# Patient Record
Sex: FEMALE | Race: WHITE | NOT HISPANIC OR LATINO | Employment: FULL TIME | URBAN - NONMETROPOLITAN AREA
[De-identification: names, ages, dates, MRNs, and addresses within clinical notes are randomized per-mention and may not be internally consistent; named-entity substitution may affect disease eponyms.]

---

## 2017-09-13 ENCOUNTER — OFFICE VISIT (OUTPATIENT)
Dept: FAMILY MEDICINE CLINIC | Facility: CLINIC | Age: 32
End: 2017-09-13

## 2017-09-13 VITALS
HEIGHT: 63 IN | HEART RATE: 90 BPM | OXYGEN SATURATION: 95 % | SYSTOLIC BLOOD PRESSURE: 138 MMHG | DIASTOLIC BLOOD PRESSURE: 84 MMHG | BODY MASS INDEX: 30.02 KG/M2 | TEMPERATURE: 97.6 F | WEIGHT: 169.4 LBS

## 2017-09-13 DIAGNOSIS — J06.9 ACUTE URI: ICD-10-CM

## 2017-09-13 DIAGNOSIS — H65.191 OTHER ACUTE NONSUPPURATIVE OTITIS MEDIA OF RIGHT EAR, RECURRENCE NOT SPECIFIED: Primary | ICD-10-CM

## 2017-09-13 DIAGNOSIS — J40 BRONCHITIS: ICD-10-CM

## 2017-09-13 PROCEDURE — 99203 OFFICE O/P NEW LOW 30 MIN: CPT | Performed by: PHYSICIAN ASSISTANT

## 2017-09-13 RX ORDER — IBUPROFEN 200 MG
200 TABLET ORAL AS NEEDED
COMMUNITY

## 2017-09-13 RX ORDER — CEFDINIR 300 MG/1
300 CAPSULE ORAL 2 TIMES DAILY
Qty: 20 CAPSULE | Refills: 0 | Status: SHIPPED | OUTPATIENT
Start: 2017-09-13 | End: 2017-10-13

## 2017-09-13 RX ORDER — ACETAMINOPHEN, ASPIRIN AND CAFFEINE 250; 250; 65 MG/1; MG/1; MG/1
1 TABLET, FILM COATED ORAL AS NEEDED
COMMUNITY

## 2017-09-13 RX ORDER — ALBUTEROL SULFATE 90 UG/1
2 AEROSOL, METERED RESPIRATORY (INHALATION) EVERY 4 HOURS PRN
Qty: 1 INHALER | Refills: 0 | Status: SHIPPED | OUTPATIENT
Start: 2017-09-13

## 2017-09-13 NOTE — PROGRESS NOTES
Subjective   Shasta Godinez is a 32 y.o. female. PATIENT IS BEING SEEN TODAY TO ESTABLISH CARE. SHE IS NOT FASTING TODAY. SHE DOES WANT TO DISCUSS A COUGH AND NASAL CONGESTION. THIS HAS BEEN GOING FOR 4 DAYS. SHE STATES SHE HAS BEEN TAKING DELSYM, MUCINEX, AND CLARITIN FOR SYMPTOMS. IT SEEMS TO BE HELPING BUT NOT ALL THE WAY. SHE WOULD LIKE TO BE PRESCRIBED CLARITIN D OR SOMETHING SIMILAR.     History of Present Illness     PREVIOUS PCP- DR JACKSON- STARTED BETWEEN AGE 4-6 YEARS OF AGE. LAST SEEN THERE 2-3 YEARS AGO.     STARTED WITH DRY COUGH 16. EARS FEEL STOPPED UP BUT NOT PAINFUL. NO V/D. NO FEVER. THROAT TENDER LAST NIGHT AND THIS AM FROM COUGH. COUGH WORSE IN MORNING AND NIGHT. NO SICK CONTACTS. DELSYM, MUCINEX, AND CLARITIN WITHOUT HELP.     HX ANEMIA WITH PREGNANCY. FATHER  WHEN WAS 13 - TOOK PAXIL. STARTED THEN AND STOPPED YEARS AGO.     DX MIGRAINES AS A CHILD- PREVIOUSLY WATCHED WITH NUMBNESS IN HANDS, FACE, MOUTH, THROAT ON 1 SIDE OF THE BODY, COULDN'T SEE, WALK. NOW HAS EXCEDRIN MIGRAINE THAT SHE TAKES BEFORE GETS BAD. ALSO GOT PIERCING. GETS MIGRAINES WITHOUT HEADACHE- SPOTS IN VISION, NUMBNESS IN FINGERS AND UP ARM, FACE, MOUTH AND THROAT GET NUMB- HAS TO TAKE EXCEDRIN AND LAY DOWN. HAD MRI WHEN WAS YOUNGER. WAS SEEN BY SPECIALIST. AFTER HAD SON, WOULD HAVE EPISODES WITHOUT HEADACHES- HAD EEG NORMAL. STATES WAS TOLD WAS HAVING MIGRAINE WITHOUT THE HEADACHE.     The following portions of the patient's history were reviewed and updated as appropriate: allergies, current medications, past family history, past medical history, past social history, past surgical history and problem list.    Review of Systems   HENT: Positive for congestion (NASAL CONGESTION.).    Respiratory: Positive for cough.    Neurological: Positive for headaches.   All other systems reviewed and are negative.      Objective   Physical Exam   Constitutional: She is oriented to person, place, and time. Vital signs are normal. She  appears well-developed and well-nourished.   HENT:   Head: Normocephalic and atraumatic.   Right Ear: External ear and ear canal normal. Tympanic membrane is injected and erythematous. A middle ear effusion is present.   Left Ear: Tympanic membrane, external ear and ear canal normal.   Nose: Nose normal.   Mouth/Throat: Uvula is midline, oropharynx is clear and moist and mucous membranes are normal.   Eyes: Conjunctivae are normal.   Neck: Neck supple.   Cardiovascular: Normal rate and regular rhythm.  Exam reveals no gallop and no friction rub.    Murmur (RUSB) heard.   Systolic murmur is present with a grade of 2/6   Pulmonary/Chest: Effort normal and breath sounds normal. She has no wheezes. She has no rhonchi. She has no rales.   Neurological: She is alert and oriented to person, place, and time.   Skin: Skin is warm and dry.   Psychiatric: She has a normal mood and affect. Her speech is normal and behavior is normal. Judgment and thought content normal. Cognition and memory are normal.   Nursing note and vitals reviewed.      Assessment/Plan   Shasta was seen today for establish care, cough and nasal congestion.    Diagnoses and all orders for this visit:    Other acute nonsuppurative otitis media of right ear, recurrence not specified  -     albuterol (PROVENTIL HFA;VENTOLIN HFA) 108 (90 Base) MCG/ACT inhaler; Inhale 2 puffs Every 4 (Four) Hours As Needed for Wheezing.  -     cefdinir (OMNICEF) 300 MG capsule; Take 1 capsule by mouth 2 (Two) Times a Day.    Acute URI  -     albuterol (PROVENTIL HFA;VENTOLIN HFA) 108 (90 Base) MCG/ACT inhaler; Inhale 2 puffs Every 4 (Four) Hours As Needed for Wheezing.  -     cefdinir (OMNICEF) 300 MG capsule; Take 1 capsule by mouth 2 (Two) Times a Day.    Bronchitis  -     albuterol (PROVENTIL HFA;VENTOLIN HFA) 108 (90 Base) MCG/ACT inhaler; Inhale 2 puffs Every 4 (Four) Hours As Needed for Wheezing.  -     cefdinir (OMNICEF) 300 MG capsule; Take 1 capsule by mouth 2 (Two)  Times a Day.        Patient Instructions   32 YEAR OLD FEMALE WHO PRESENTS TODAY AS A NEW PATIENT. SHE HAS PMH SIGNIFICANT FOR ANEMIA WITH PREGNANCY AND MIGRAINE HEADACHES. SHE HAS BEEN STABLE. PATIENT WITHOUT ROUTINE PRIMARY CARE FOR A COUPLE YEARS. I HAVE ASKED THAT SHE SIGN A MEDICAL RELEASE TODAY FOR PREVIOUS PCP AND FOLLOW UP IN 1 MONTH FOR CPE WITH FASTING LABS TO INCLUDE LABS FOR ANEMIA. PATIENT TO CALL 1 WEEK PRIOR TO ENSURE WE HAVE RECEIVED RECORDS. SHE HAD HEART MURMUR ON EXAM TODAY- I WILL RE-EVALUATE AT CPE. IF CONTINUES WITH HEART MURMUR, SHOULD CONSIDER ECHO.     SHE STARTED 9/9/17 WITH COUGHING AND HAS HAD EARS FEELING FULL. RIGHT AOM ON EXAM- OTHERWISE, BENIGN. I WOULD LIKE HER TO CONTINUE CLARITIN 10 MG ONCE DAILY, MUCINEX AND DELSYM TWICE DAILY, AND START FLONASE 2 SPRAYS IN EACH NOSTRIL ONCE DAILY. I WILL ALSO HAVE HER USE ALBUTEROL INHALER AS NEEDED AND OMNICEF TWICE DAILY FOR 10 DAYS. TO CALL OR RETURN IF WORSENING OR NEW SYMPTOMS.

## 2017-09-13 NOTE — PATIENT INSTRUCTIONS
32 YEAR OLD FEMALE WHO PRESENTS TODAY AS A NEW PATIENT. SHE HAS PMH SIGNIFICANT FOR ANEMIA WITH PREGNANCY AND MIGRAINE HEADACHES. SHE HAS BEEN STABLE. PATIENT WITHOUT ROUTINE PRIMARY CARE FOR A COUPLE YEARS. I HAVE ASKED THAT SHE SIGN A MEDICAL RELEASE TODAY FOR PREVIOUS PCP AND FOLLOW UP IN 1 MONTH FOR CPE WITH FASTING LABS TO INCLUDE LABS FOR ANEMIA. PATIENT TO CALL 1 WEEK PRIOR TO ENSURE WE HAVE RECEIVED RECORDS. SHE HAD HEART MURMUR ON EXAM TODAY- I WILL RE-EVALUATE AT CPE. IF CONTINUES WITH HEART MURMUR, SHOULD CONSIDER ECHO.     SHE STARTED 9/9/17 WITH COUGHING AND HAS HAD EARS FEELING FULL. RIGHT AOM ON EXAM- OTHERWISE, BENIGN. I WOULD LIKE HER TO CONTINUE CLARITIN 10 MG ONCE DAILY, MUCINEX AND DELSYM TWICE DAILY, AND START FLONASE 2 SPRAYS IN EACH NOSTRIL ONCE DAILY. I WILL ALSO HAVE HER USE ALBUTEROL INHALER AS NEEDED AND OMNICEF TWICE DAILY FOR 10 DAYS. TO CALL OR RETURN IF WORSENING OR NEW SYMPTOMS.   
Universal Safety Interventions

## 2017-09-15 ENCOUNTER — TELEPHONE (OUTPATIENT)
Dept: FAMILY MEDICINE CLINIC | Facility: CLINIC | Age: 32
End: 2017-09-15

## 2017-09-15 DIAGNOSIS — R94.39 ABNORMAL CAROTID DUPLEX SCAN: Primary | ICD-10-CM

## 2017-09-15 NOTE — TELEPHONE ENCOUNTER
I REVIEWED DR JACKSON'S RECORDS- PATIENT HAD POSSIBLE CAROTID STENOSIS ON CAROTID DUPLEX IN THE PAST. I WILL REPEAT TESTING.

## 2017-09-18 NOTE — TELEPHONE ENCOUNTER
PLEASE GIVE HER THE INFORMATION FOR THE CAROTID SCREENING- I THINK IT IS $30. IT IS TO REPEAT FROM AN ABNORMAL SCAN WITH DR JACKSON

## 2017-09-18 NOTE — TELEPHONE ENCOUNTER
Patient wants to know why she has to have the Carotid doppler that it is going to cost her $600 her portion and she can't afford it wants to know if there is anything else cheaper

## 2017-09-22 ENCOUNTER — TELEPHONE (OUTPATIENT)
Dept: FAMILY MEDICINE CLINIC | Facility: CLINIC | Age: 32
End: 2017-09-22

## 2017-09-22 NOTE — TELEPHONE ENCOUNTER
I WILL NOT REFILL ANTIBIOTIC. I CAN GIVE DAILY INHALER IF SHE CONTINUES WITH COUGH OR SHE WILL NEED RE-EVALUATION WITH CONTINUED COUGH.

## 2017-09-22 NOTE — TELEPHONE ENCOUNTER
Pt called requesting another round of antibiotics. She is almost done with her first round but still has a cough. She wants to know if you will send in another round or something stronger for her. She would like this sent to [facility].

## 2017-10-13 ENCOUNTER — OFFICE VISIT (OUTPATIENT)
Dept: FAMILY MEDICINE CLINIC | Facility: CLINIC | Age: 32
End: 2017-10-13

## 2017-10-13 VITALS
DIASTOLIC BLOOD PRESSURE: 78 MMHG | SYSTOLIC BLOOD PRESSURE: 128 MMHG | OXYGEN SATURATION: 98 % | WEIGHT: 172.6 LBS | TEMPERATURE: 98 F | BODY MASS INDEX: 30.58 KG/M2 | HEART RATE: 98 BPM | HEIGHT: 63 IN

## 2017-10-13 DIAGNOSIS — Z00.00 ROUTINE ADULT HEALTH MAINTENANCE: Primary | ICD-10-CM

## 2017-10-13 DIAGNOSIS — R31.9 HEMATURIA, UNSPECIFIED TYPE: ICD-10-CM

## 2017-10-13 DIAGNOSIS — D64.9 ANEMIA, UNSPECIFIED TYPE: ICD-10-CM

## 2017-10-13 LAB
BILIRUB BLD-MCNC: NEGATIVE MG/DL
CLARITY, POC: ABNORMAL
COLOR UR: YELLOW
GLUCOSE UR STRIP-MCNC: NEGATIVE MG/DL
KETONES UR QL: NEGATIVE
LEUKOCYTE EST, POC: ABNORMAL
NITRITE UR-MCNC: NEGATIVE MG/ML
PH UR: 7.5 [PH] (ref 5–8)
PROT UR STRIP-MCNC: ABNORMAL MG/DL
RBC # UR STRIP: ABNORMAL /UL
SP GR UR: 1.01 (ref 1–1.03)
UROBILINOGEN UR QL: NORMAL

## 2017-10-13 PROCEDURE — 81003 URINALYSIS AUTO W/O SCOPE: CPT | Performed by: PHYSICIAN ASSISTANT

## 2017-10-13 PROCEDURE — 99395 PREV VISIT EST AGE 18-39: CPT | Performed by: PHYSICIAN ASSISTANT

## 2017-10-13 RX ORDER — LORATADINE 10 MG/1
10 CAPSULE, LIQUID FILLED ORAL
COMMUNITY

## 2017-10-13 NOTE — PATIENT INSTRUCTIONS
32 YEAR OLD FEMALE WHO PRESENTS TODAY FOR CPE. SHE IS DOING WELL WITHOUT COMPLAINTS. CPE COMPLETED. PAP COMPLETED. FASTING LABS TODAY- CALL IF NO RESULTS IN 1 WEEK. FOLLOW UP PENDING RESULTS. PATIENT IS TO SCHEDULE TRIPLE ARTERIAL SCREEN ASAP. SHE HAD POSSIBLE ABNORMAL CAROTID DUPLEX IN THE PAST WITH PREVIOUS PCP.     PATIENT HAS PALPABLE ABNORMALITY OVER MIDDLE OF LEFT TIBIA ANTERIORLY. TIBIA IS NOT SMOOTH WITH SLIGHT DEPRESSION THEN SOFT TISSUE DENSITY VS VARICOSE VEIN OVERLYING. CONSIDERATION OF TIB/FIB XRAY AND IF NORMAL, US TO BETTER CHARACTERIZE. PATIENT WILL LET ME KNOW WHEN IS FINANCIALLY FEASIBLE.

## 2017-10-13 NOTE — PROGRESS NOTES
Subjective   Shasta Godinez is a 32 y.o. female.  Here for physical and pap today.  Declines flu shot.    History of Present Illness     PATIENT STATES SHE WAS UNABLE TO DO CAROTID ARTERY DUPLEX DUE TO COST. JUST RECEIVED SCREENING INFORMATION IN THE MAIL AND WILL SCHEDULE.     LAST PAP- 3-4 YEARS. NO HX ABNORMAL PAP. SA 1 PARTNER X 18 YEARS. SOME PC BLEEDING. NO DYSPAREUNIA. SOMETIME D/C AND ODOR. NO ITCHING.   NO MAMMOGRAM  NO COLONOSCOPY  COUSIN WITH BREAST CA- NO OTHER FHX BREAST, COLON, OVARIAN OR UTERINE CA.   LAST TD 2 YEARS AGO- DR RAZA OFFICE FOR PATIENT SCRATCH.   NO FLU SHOT.     NO NEW COMPLAINTS OR PROBLEMS.     The following portions of the patient's history were reviewed and updated as appropriate: allergies, current medications, past family history, past medical history, past social history, past surgical history and problem list.    Review of Systems   Respiratory: Positive for cough.    All other systems reviewed and are negative.      Objective   Physical Exam   Constitutional: She is oriented to person, place, and time. She appears well-developed and well-nourished. No distress.   HENT:   Head: Normocephalic and atraumatic.   Right Ear: Hearing, tympanic membrane, external ear and ear canal normal.   Left Ear: Hearing, tympanic membrane, external ear and ear canal normal.   Nose: Nose normal.   Mouth/Throat: Oropharynx is clear and moist.   Eyes: Conjunctivae, EOM and lids are normal. Pupils are equal, round, and reactive to light.   Neck: Neck supple. No JVD present. Carotid bruit is not present. No tracheal deviation present. No thyroid mass and no thyromegaly present.   Cardiovascular: Normal rate, regular rhythm, normal heart sounds and intact distal pulses.  Exam reveals no gallop and no friction rub.    No murmur heard.  Pulses:       Radial pulses are 2+ on the right side, and 2+ on the left side.        Posterior tibial pulses are 2+ on the right side, and 2+ on the left side.    Pulmonary/Chest: Effort normal and breath sounds normal. No respiratory distress. She has no wheezes. She has no rhonchi. She has no rales. Right breast exhibits no inverted nipple, no mass, no nipple discharge, no skin change and no tenderness. Left breast exhibits no inverted nipple, no mass, no nipple discharge, no skin change and no tenderness.   Abdominal: Soft. Normal aorta and bowel sounds are normal. She exhibits no distension and no abdominal bruit. There is no hepatosplenomegaly. There is no tenderness. There is no rigidity, no rebound and no guarding. No hernia.   Genitourinary: Uterus normal. There is no rash, tenderness, lesion or injury on the right labia. There is no rash, tenderness, lesion or injury on the left labia. Cervix exhibits no motion tenderness, no discharge and no friability. Right adnexum displays tenderness. Right adnexum displays no mass and no fullness. Left adnexum displays no mass, no tenderness and no fullness. Vaginal discharge (MILD WHITE, THIN D/C., ) found.   Musculoskeletal: Normal range of motion. She exhibits no edema, tenderness or deformity.        Legs:  SLIGHT DEPRESSION OF TIBIA AT AREA INDICATED IN YELLOW WITH SOFT TISSUE DENSITY OVERLYING VS VARICOSE VEIN- VARICOSE VEIN NOTED LATERAL TO AREA.    Lymphadenopathy:     She has no cervical adenopathy.     She has no axillary adenopathy.   Neurological: She is alert and oriented to person, place, and time. She has normal strength and normal reflexes. She displays normal reflexes. No cranial nerve deficit or sensory deficit. She exhibits normal muscle tone. Coordination and gait normal.   Skin: Skin is warm and dry. No rash noted. She is not diaphoretic. No erythema.   Psychiatric: She has a normal mood and affect. Her speech is normal and behavior is normal. Judgment and thought content normal. Cognition and memory are normal.       Assessment/Plan   Shasta was seen today for annual exam.    Diagnoses and all orders for  this visit:    Routine adult health maintenance  -     CBC & Differential  -     Comprehensive Metabolic Panel  -     Lipid Panel With LDL / HDL Ratio  -     Thyroid Panel With TSH  -     Iron and TIBC  -     Ferritin  -     Vitamin B12 & Folate  -     Vitamin D 25 Hydroxy  -     POC Urinalysis Dipstick, Automated  -     Pap IG, Ct-Ng, Rfx HPV ASCU - ThinPrep Vial, Cervix    Anemia, unspecified type  -     CBC & Differential  -     Comprehensive Metabolic Panel  -     Lipid Panel With LDL / HDL Ratio  -     Thyroid Panel With TSH  -     Iron and TIBC  -     Ferritin  -     Vitamin B12 & Folate  -     Vitamin D 25 Hydroxy  -     POC Urinalysis Dipstick, Automated  -     Pap IG, Ct-Ng, Rfx HPV ASCU - ThinPrep Vial, Cervix    Hematuria, unspecified type  -     Urinalysis With Microscopic - Urine, Clean Catch  -     Urine Culture - Urine, Urine, Clean Catch      Patient Instructions   32 YEAR OLD FEMALE WHO PRESENTS TODAY FOR CPE. SHE IS DOING WELL WITHOUT COMPLAINTS. CPE COMPLETED. PAP COMPLETED. FASTING LABS TODAY- CALL IF NO RESULTS IN 1 WEEK. FOLLOW UP PENDING RESULTS. PATIENT IS TO SCHEDULE TRIPLE ARTERIAL SCREEN ASAP. SHE HAD POSSIBLE ABNORMAL CAROTID DUPLEX IN THE PAST WITH PREVIOUS PCP.     PATIENT HAS PALPABLE ABNORMALITY OVER MIDDLE OF LEFT TIBIA ANTERIORLY. TIBIA IS NOT SMOOTH WITH SLIGHT DEPRESSION THEN SOFT TISSUE DENSITY VS VARICOSE VEIN OVERLYING. CONSIDERATION OF TIB/FIB XRAY AND IF NORMAL, US TO BETTER CHARACTERIZE. PATIENT WILL LET ME KNOW WHEN IS FINANCIALLY FEASIBLE.

## 2017-10-14 LAB
25(OH)D3+25(OH)D2 SERPL-MCNC: 34.2 NG/ML (ref 30–100)
ALBUMIN SERPL-MCNC: 4.3 G/DL (ref 3.5–5.2)
ALBUMIN/GLOB SERPL: 1.8 G/DL
ALP SERPL-CCNC: 56 U/L (ref 39–117)
ALT SERPL-CCNC: 16 U/L (ref 1–33)
AST SERPL-CCNC: 12 U/L (ref 1–32)
BASOPHILS # BLD AUTO: 0.02 10*3/MM3 (ref 0–0.2)
BASOPHILS NFR BLD AUTO: 0.2 % (ref 0–1.5)
BILIRUB SERPL-MCNC: 0.3 MG/DL (ref 0.1–1.2)
BUN SERPL-MCNC: 16 MG/DL (ref 6–20)
BUN/CREAT SERPL: 17 (ref 7–25)
CALCIUM SERPL-MCNC: 9.4 MG/DL (ref 8.6–10.5)
CHLORIDE SERPL-SCNC: 102 MMOL/L (ref 98–107)
CHOLEST SERPL-MCNC: 210 MG/DL (ref 0–200)
CO2 SERPL-SCNC: 27.7 MMOL/L (ref 22–29)
CREAT SERPL-MCNC: 0.94 MG/DL (ref 0.57–1)
EOSINOPHIL # BLD AUTO: 0.59 10*3/MM3 (ref 0–0.7)
EOSINOPHIL NFR BLD AUTO: 6.2 % (ref 0.3–6.2)
ERYTHROCYTE [DISTWIDTH] IN BLOOD BY AUTOMATED COUNT: 14.1 % (ref 11.7–13)
FERRITIN SERPL-MCNC: 99.09 NG/ML (ref 13–150)
FOLATE SERPL-MCNC: 15.25 NG/ML (ref 4.78–24.2)
FT4I SERPL CALC-MCNC: 2.1 (ref 1.2–4.9)
GLOBULIN SER CALC-MCNC: 2.4 GM/DL
GLUCOSE SERPL-MCNC: 88 MG/DL (ref 65–99)
HCT VFR BLD AUTO: 42.1 % (ref 35.6–45.5)
HDLC SERPL-MCNC: 34 MG/DL (ref 40–60)
HGB BLD-MCNC: 13.4 G/DL (ref 11.9–15.5)
IMM GRANULOCYTES # BLD: 0.02 10*3/MM3 (ref 0–0.03)
IMM GRANULOCYTES NFR BLD: 0.2 % (ref 0–0.5)
IRON SATN MFR SERPL: 23 % (ref 20–50)
IRON SERPL-MCNC: 77 MCG/DL (ref 37–145)
LDLC SERPL CALC-MCNC: 146 MG/DL (ref 0–100)
LDLC/HDLC SERPL: 4.31 {RATIO}
LYMPHOCYTES # BLD AUTO: 2.15 10*3/MM3 (ref 0.9–4.8)
LYMPHOCYTES NFR BLD AUTO: 22.5 % (ref 19.6–45.3)
MCH RBC QN AUTO: 29.6 PG (ref 26.9–32)
MCHC RBC AUTO-ENTMCNC: 31.8 G/DL (ref 32.4–36.3)
MCV RBC AUTO: 93.1 FL (ref 80.5–98.2)
MONOCYTES # BLD AUTO: 0.61 10*3/MM3 (ref 0.2–1.2)
MONOCYTES NFR BLD AUTO: 6.4 % (ref 5–12)
NEUTROPHILS # BLD AUTO: 6.17 10*3/MM3 (ref 1.9–8.1)
NEUTROPHILS NFR BLD AUTO: 64.5 % (ref 42.7–76)
PLATELET # BLD AUTO: 331 10*3/MM3 (ref 140–500)
POTASSIUM SERPL-SCNC: 4.7 MMOL/L (ref 3.5–5.2)
PROT SERPL-MCNC: 6.7 G/DL (ref 6–8.5)
RBC # BLD AUTO: 4.52 10*6/MM3 (ref 3.9–5.2)
SODIUM SERPL-SCNC: 141 MMOL/L (ref 136–145)
T3RU NFR SERPL: 28 % (ref 24–39)
T4 SERPL-MCNC: 7.4 UG/DL (ref 4.5–12)
TIBC SERPL-MCNC: 340 MCG/DL
TRIGL SERPL-MCNC: 148 MG/DL (ref 0–150)
TSH SERPL DL<=0.005 MIU/L-ACNC: 1.64 UIU/ML (ref 0.45–4.5)
UIBC SERPL-MCNC: 263 MCG/DL
VIT B12 SERPL-MCNC: 256 PG/ML (ref 211–946)
VLDLC SERPL CALC-MCNC: 29.6 MG/DL (ref 5–40)
WBC # BLD AUTO: 9.56 10*3/MM3 (ref 4.5–10.7)

## 2017-10-15 LAB
APPEARANCE UR: (no result)
BACTERIA #/AREA URNS HPF: ABNORMAL /HPF
BACTERIA UR CULT: NORMAL
BACTERIA UR CULT: NORMAL
BILIRUB UR QL STRIP: NEGATIVE
CASTS URNS MICRO: ABNORMAL
COLOR UR: YELLOW
EPI CELLS #/AREA URNS HPF: ABNORMAL /HPF
GLUCOSE UR QL: NEGATIVE
HGB UR QL STRIP: NEGATIVE
KETONES UR QL STRIP: NEGATIVE
LEUKOCYTE ESTERASE UR QL STRIP: (no result)
NITRITE UR QL STRIP: NEGATIVE
PH UR STRIP: 8 [PH] (ref 5–8)
PROT UR QL STRIP: (no result)
RBC #/AREA URNS HPF: ABNORMAL /HPF
SP GR UR: 1.01 (ref 1–1.03)
UROBILINOGEN UR STRIP-MCNC: (no result) MG/DL
WBC #/AREA URNS HPF: ABNORMAL /HPF

## 2017-10-16 DIAGNOSIS — R80.9 PROTEINURIA, UNSPECIFIED TYPE: Primary | ICD-10-CM

## 2017-10-18 LAB
C TRACH RRNA CVX QL NAA+PROBE: NEGATIVE
CONV .: NORMAL
CYTOLOGIST CVX/VAG CYTO: NORMAL
CYTOLOGY CVX/VAG DOC THIN PREP: NORMAL
DX ICD CODE: NORMAL
DX ICD CODE: NORMAL
HIV 1 & 2 AB SER-IMP: NORMAL
Lab: NORMAL
N GONORRHOEA RRNA CVX QL NAA+PROBE: NEGATIVE
OTHER STN SPEC: NORMAL
PATH REPORT.FINAL DX SPEC: NORMAL
STAT OF ADQ CVX/VAG CYTO-IMP: NORMAL

## 2017-10-20 ENCOUNTER — CLINICAL SUPPORT (OUTPATIENT)
Dept: FAMILY MEDICINE CLINIC | Facility: CLINIC | Age: 32
End: 2017-10-20

## 2017-10-20 DIAGNOSIS — E53.8 VITAMIN B12 DEFICIENCY: Primary | ICD-10-CM

## 2017-10-20 PROCEDURE — 96372 THER/PROPH/DIAG INJ SC/IM: CPT | Performed by: NURSE PRACTITIONER

## 2017-10-20 RX ORDER — METRONIDAZOLE 7.5 MG/G
GEL VAGINAL NIGHTLY
Qty: 70 G | Refills: 0 | Status: SHIPPED | OUTPATIENT
Start: 2017-10-20 | End: 2017-10-25

## 2017-10-20 RX ORDER — CYANOCOBALAMIN 1000 UG/ML
1000 INJECTION, SOLUTION INTRAMUSCULAR; SUBCUTANEOUS
Status: DISCONTINUED | OUTPATIENT
Start: 2017-10-20 | End: 2017-10-27

## 2017-10-20 RX ADMIN — CYANOCOBALAMIN 1000 MCG: 1000 INJECTION, SOLUTION INTRAMUSCULAR; SUBCUTANEOUS at 10:31

## 2017-10-24 LAB
PROT 24H UR-MRATE: 472.5 MG/24HOURS (ref 0–150)
PROT UR-MCNC: 21 MG/DL

## 2017-10-27 ENCOUNTER — CLINICAL SUPPORT (OUTPATIENT)
Dept: FAMILY MEDICINE CLINIC | Facility: CLINIC | Age: 32
End: 2017-10-27

## 2017-10-27 DIAGNOSIS — E53.8 LOW VITAMIN B12 LEVEL: Primary | ICD-10-CM

## 2017-10-27 PROCEDURE — 96372 THER/PROPH/DIAG INJ SC/IM: CPT | Performed by: PHYSICIAN ASSISTANT

## 2017-10-27 RX ORDER — CYANOCOBALAMIN 1000 UG/ML
1000 INJECTION, SOLUTION INTRAMUSCULAR; SUBCUTANEOUS
Status: DISCONTINUED | OUTPATIENT
Start: 2017-10-27 | End: 2017-11-03

## 2017-11-03 ENCOUNTER — CLINICAL SUPPORT (OUTPATIENT)
Dept: FAMILY MEDICINE CLINIC | Facility: CLINIC | Age: 32
End: 2017-11-03

## 2017-11-03 DIAGNOSIS — E53.8 LOW VITAMIN B12 LEVEL: Primary | ICD-10-CM

## 2017-11-03 PROCEDURE — 96372 THER/PROPH/DIAG INJ SC/IM: CPT | Performed by: PHYSICIAN ASSISTANT

## 2017-11-03 RX ORDER — CYANOCOBALAMIN 1000 UG/ML
1000 INJECTION, SOLUTION INTRAMUSCULAR; SUBCUTANEOUS
Status: DISCONTINUED | OUTPATIENT
Start: 2017-11-03 | End: 2017-11-03

## 2017-11-03 RX ORDER — CYANOCOBALAMIN 1000 UG/ML
1000 INJECTION, SOLUTION INTRAMUSCULAR; SUBCUTANEOUS
Status: SHIPPED | OUTPATIENT
Start: 2017-11-03

## 2017-11-03 RX ADMIN — CYANOCOBALAMIN 1000 MCG: 1000 INJECTION, SOLUTION INTRAMUSCULAR; SUBCUTANEOUS at 15:44

## 2017-11-10 ENCOUNTER — CLINICAL SUPPORT (OUTPATIENT)
Dept: FAMILY MEDICINE CLINIC | Facility: CLINIC | Age: 32
End: 2017-11-10

## 2017-11-10 DIAGNOSIS — R80.9 PROTEINURIA, UNSPECIFIED TYPE: Primary | ICD-10-CM

## 2017-11-10 LAB
BILIRUB BLD-MCNC: NEGATIVE MG/DL
CLARITY, POC: CLEAR
COLOR UR: ABNORMAL
GLUCOSE UR STRIP-MCNC: NEGATIVE MG/DL
KETONES UR QL: NEGATIVE
LEUKOCYTE EST, POC: ABNORMAL
NITRITE UR-MCNC: NEGATIVE MG/ML
PH UR: 6.5 [PH] (ref 5–8)
PROT UR STRIP-MCNC: ABNORMAL MG/DL
RBC # UR STRIP: NEGATIVE /UL
SP GR UR: 1.01 (ref 1–1.03)
UROBILINOGEN UR QL: NORMAL

## 2017-11-10 PROCEDURE — 81003 URINALYSIS AUTO W/O SCOPE: CPT | Performed by: NURSE PRACTITIONER

## 2017-11-17 ENCOUNTER — CLINICAL SUPPORT (OUTPATIENT)
Dept: FAMILY MEDICINE CLINIC | Facility: CLINIC | Age: 32
End: 2017-11-17

## 2017-11-17 ENCOUNTER — TELEPHONE (OUTPATIENT)
Dept: FAMILY MEDICINE CLINIC | Facility: CLINIC | Age: 32
End: 2017-11-17

## 2017-11-17 DIAGNOSIS — E53.8 LOW VITAMIN B12 LEVEL: ICD-10-CM

## 2017-11-17 PROCEDURE — 96372 THER/PROPH/DIAG INJ SC/IM: CPT | Performed by: PHYSICIAN ASSISTANT

## 2017-11-17 RX ADMIN — CYANOCOBALAMIN 1000 MCG: 1000 INJECTION, SOLUTION INTRAMUSCULAR; SUBCUTANEOUS at 15:53

## 2017-11-17 NOTE — TELEPHONE ENCOUNTER
Pt came in for UA last week. She still had proteinuria. She was supposed to get a referral for Nephrology. I do not believe that a message was ever sent to you. I informed pt of what had happened and she is okay on the wait. She did want to mention that she had a Nephrologist in [phone] and she believes the specialist name was Dr. Yadav.

## 2017-11-20 DIAGNOSIS — R80.9 PROTEINURIA, UNSPECIFIED TYPE: Primary | ICD-10-CM

## 2017-11-22 ENCOUNTER — CLINICAL SUPPORT (OUTPATIENT)
Dept: FAMILY MEDICINE CLINIC | Facility: CLINIC | Age: 32
End: 2017-11-22

## 2017-11-22 PROCEDURE — 96372 THER/PROPH/DIAG INJ SC/IM: CPT | Performed by: PHYSICIAN ASSISTANT

## 2017-11-22 RX ADMIN — CYANOCOBALAMIN 1000 MCG: 1000 INJECTION, SOLUTION INTRAMUSCULAR; SUBCUTANEOUS at 15:41

## 2018-01-25 ENCOUNTER — OFFICE VISIT (OUTPATIENT)
Dept: FAMILY MEDICINE CLINIC | Facility: CLINIC | Age: 33
End: 2018-01-25

## 2018-01-25 VITALS
SYSTOLIC BLOOD PRESSURE: 128 MMHG | OXYGEN SATURATION: 98 % | WEIGHT: 170.6 LBS | BODY MASS INDEX: 30.23 KG/M2 | HEIGHT: 63 IN | TEMPERATURE: 97.7 F | DIASTOLIC BLOOD PRESSURE: 88 MMHG | HEART RATE: 83 BPM

## 2018-01-25 DIAGNOSIS — E78.5 HYPERLIPIDEMIA, UNSPECIFIED HYPERLIPIDEMIA TYPE: ICD-10-CM

## 2018-01-25 DIAGNOSIS — R80.9 PROTEINURIA, UNSPECIFIED TYPE: ICD-10-CM

## 2018-01-25 DIAGNOSIS — E53.8 B12 DEFICIENCY: ICD-10-CM

## 2018-01-25 DIAGNOSIS — R31.9 HEMATURIA, UNSPECIFIED TYPE: Primary | ICD-10-CM

## 2018-01-25 LAB
BILIRUB BLD-MCNC: NEGATIVE MG/DL
CLARITY, POC: CLEAR
COLOR UR: YELLOW
GLUCOSE UR STRIP-MCNC: NEGATIVE MG/DL
KETONES UR QL: NEGATIVE
LEUKOCYTE EST, POC: NEGATIVE
NITRITE UR-MCNC: NEGATIVE MG/ML
PH UR: 7 [PH] (ref 5–8)
PROT UR STRIP-MCNC: NEGATIVE MG/DL
RBC # UR STRIP: NEGATIVE /UL
SP GR UR: 1.01 (ref 1–1.03)
UROBILINOGEN UR QL: NORMAL
VIT B12 SERPL-MCNC: 410 PG/ML (ref 211–946)

## 2018-01-25 PROCEDURE — 81003 URINALYSIS AUTO W/O SCOPE: CPT | Performed by: FAMILY MEDICINE

## 2018-01-25 PROCEDURE — 99214 OFFICE O/P EST MOD 30 MIN: CPT | Performed by: FAMILY MEDICINE

## 2018-01-25 NOTE — PROGRESS NOTES
Subjective   Shasta Godinez is a 32 y.o. female.  Here to follow up on cholesterol and B12, urine protein.  Also complaining of urinary frequency/burning today.    History of Present Illness     Established patient but new to me.  Cholesterol-  this was last checked October 13, 2017 and showed that the total cholesterol was elevated at 210.  The LDL cholesterol was elevated at 146.  At that time she was recommended to increase her exercise and diet work on her diet.  She mentions that she has not been able to work on this very much due to her job current meds.  She is not sure how to go about working on her diet.  She has not lost any significant weight since last visit.    Vitamin B12-her vitamin vitamin B12 was slightly on the low end of normal at 256 she was asked to follow-up after she completed 6 weeks of B12 shots.  She completed the shots as scheduled.  She continues to have occasional neuropathy in her bilateral hands mainly her second and third digits.    Urine protein-a 24-hour urine protein was completed on October 24, 2017 and showed an elevated value of 472.5 with a high normal lab value of 150.  According to the notes she should've been referred to nephrology.  This is being followed by the kidney doctors.  They've written a lab order for BMP and urinalysis with microscopy.    Dysuria-she also comes in complaining today of occasional dysuria with a little bit of hematuria.  She says that this is not been going on today.  She is in no pain.  No flank pain.  She just saw the kidney doctor not long ago.    The following portions of the patient's history were reviewed and updated as appropriate: allergies, current medications, past family history, past medical history, past social history, past surgical history and problem list.    Review of Systems   Constitutional: Negative for activity change, appetite change, fatigue and fever.   HENT: Negative for ear pain, facial swelling and sore throat.    Eyes:  Negative for discharge and itching.   Respiratory: Negative for cough, chest tightness and shortness of breath.    Cardiovascular: Negative for chest pain and palpitations.   Gastrointestinal: Negative for abdominal distention and constipation.   Genitourinary: Positive for dysuria and urgency.   Musculoskeletal: Negative for arthralgias and back pain.   Neurological: Negative for weakness and numbness.   Psychiatric/Behavioral: Negative for decreased concentration and dysphoric mood. The patient is not nervous/anxious.        Objective   Physical Exam   Constitutional: She is oriented to person, place, and time. She appears well-developed. No distress.   Obese   HENT:   Mouth/Throat: Oropharynx is clear and moist.   Eyes: Conjunctivae are normal. Right eye exhibits no discharge. Left eye exhibits no discharge.   Neck: Normal range of motion. Neck supple.   Cardiovascular: Normal rate, regular rhythm, normal heart sounds and intact distal pulses.  Exam reveals no gallop and no friction rub.    No murmur heard.  Pulmonary/Chest: Effort normal and breath sounds normal. She has no wheezes. She has no rales.   Abdominal: Soft. Bowel sounds are normal. She exhibits no distension. There is no tenderness.   Lymphadenopathy:     She has no cervical adenopathy.   Neurological: She is alert and oriented to person, place, and time.   Skin: Skin is warm and dry. No rash noted.   Psychiatric: She has a normal mood and affect. Her behavior is normal.   Vitals reviewed.      Assessment/Plan   Diagnoses and all orders for this visit:    Hematuria, unspecified type  -     POCT urinalysis dipstick, automated    Proteinuria, unspecified type  -     POCT urinalysis dipstick, automated    B12 deficiency  -     Vitamin B12; Future  -     Vitamin B12    Hyperlipidemia, unspecified hyperlipidemia type      The urinalysis showed no trace of blood or protein.  There is no sign of urinary tract infection.  This is being repeated through  nephrology's labs.  B12 test is being repeated today.  Regarding the hyperlipidemia I feel that we need to give her at least 3 more months before retesting her cholesterol to give her a chance to make a change.  Has been 3 months since she's been very confused about what to do.  Today we went over some simple things such as trying portioned dinners such as TV dinners.  He also try to get some recipe books that are simple and easy-to-use.  And also to eat more times a day but smaller meals even if it's just a bar at work.  She describes too many roadblocks to work on the exercise at this time so I think it be best to strategize toward the diet first and then work on exercise second.

## 2018-01-25 NOTE — PATIENT INSTRUCTIONS
Calorie Counting for Weight Loss  Calories are energy you get from the things you eat and drink. Your body uses this energy to keep you going throughout the day. The number of calories you eat affects your weight. When you eat more calories than your body needs, your body stores the extra calories as fat. When you eat fewer calories than your body needs, your body burns fat to get the energy it needs.  Calorie counting means keeping track of how many calories you eat and drink each day. If you make sure to eat fewer calories than your body needs, you should lose weight. In order for calorie counting to work, you will need to eat the number of calories that are right for you in a day to lose a healthy amount of weight per week. A healthy amount of weight to lose per week is usually 1-2 lb (0.5-0.9 kg). A dietitian can determine how many calories you need in a day and give you suggestions on how to reach your calorie goal.   WHAT IS MY MY PLAN?  My goal is to have __________ calories per day.   If I have this many calories per day, I should lose around __________ pounds per week.  WHAT DO I NEED TO KNOW ABOUT CALORIE COUNTING?  In order to meet your daily calorie goal, you will need to:  · Find out how many calories are in each food you would like to eat. Try to do this before you eat.  · Decide how much of the food you can eat.  · Write down what you ate and how many calories it had. Doing this is called keeping a food log.  WHERE DO I FIND CALORIE INFORMATION?  The number of calories in a food can be found on a Nutrition Facts label. Note that all the information on a label is based on a specific serving of the food. If a food does not have a Nutrition Facts label, try to look up the calories online or ask your dietitian for help.  HOW DO I DECIDE HOW MUCH TO EAT?  To decide how much of the food you can eat, you will need to consider both the number of calories in one serving and the size of one serving. This  information can be found on the Nutrition Facts label. If a food does not have a Nutrition Facts label, look up the information online or ask your dietitian for help.  Remember that calories are listed per serving. If you choose to have more than one serving of a food, you will have to multiply the calories per serving by the amount of servings you plan to eat. For example, the label on a package of bread might say that a serving size is 1 slice and that there are 90 calories in a serving. If you eat 1 slice, you will have eaten 90 calories. If you eat 2 slices, you will have eaten 180 calories.  HOW DO I KEEP A FOOD LOG?  After each meal, record the following information in your food log:  · What you ate.  · How much of it you ate.  · How many calories it had.  · Then, add up your calories.  Keep your food log near you, such as in a small notebook in your pocket. Another option is to use a mobile joel or website. Some programs will calculate calories for you and show you how many calories you have left each time you add an item to the log.  WHAT ARE SOME CALORIE COUNTING TIPS?  · Use your calories on foods and drinks that will fill you up and not leave you hungry. Some examples of this include foods like nuts and nut butters, vegetables, lean proteins, and high-fiber foods (more than 5 g fiber per serving).  · Eat nutritious foods and avoid empty calories. Empty calories are calories you get from foods or beverages that do not have many nutrients, such as candy and soda. It is better to have a nutritious high-calorie food (such as an avocado) than a food with few nutrients (such as a bag of chips).  · Know how many calories are in the foods you eat most often. This way, you do not have to look up how many calories they have each time you eat them.  · Look out for foods that may seem like low-calorie foods but are really high-calorie foods, such as baked goods, soda, and fat-free candy.  · Pay attention to calories  in drinks. Drinks such as sodas, specialty coffee drinks, alcohol, and juices have a lot of calories yet do not fill you up. Choose low-calorie drinks like water and diet drinks.  · Focus your calorie counting efforts on higher calorie items. Logging the calories in a garden salad that contains only vegetables is less important than calculating the calories in a milk shake.  · Find a way of tracking calories that works for you. Get creative. Most people who are successful find ways to keep track of how much they eat in a day, even if they do not count every calorie.  WHAT ARE SOME PORTION CONTROL TIPS?  · Know how many calories are in a serving. This will help you know how many servings of a certain food you can have.  · Use a measuring cup to measure serving sizes. This is helpful when you start out. With time, you will be able to estimate serving sizes for some foods.  · Take some time to put servings of different foods on your favorite plates, bowls, and cups so you know what a serving looks like.  · Try not to eat straight from a bag or box. Doing this can lead to overeating. Put the amount you would like to eat in a cup or on a plate to make sure you are eating the right portion.  · Use smaller plates, glasses, and bowls to prevent overeating. This is a quick and easy way to practice portion control. If your plate is smaller, less food can fit on it.  · Try not to multitask while eating, such as watching TV or using your computer. If it is time to eat, sit down at a table and enjoy your food. Doing this will help you to start recognizing when you are full. It will also make you more aware of what and how much you are eating.  HOW CAN I CALORIE COUNT WHEN EATING OUT?  · Ask for smaller portion sizes or child-sized portions.  · Consider sharing an entree and sides instead of getting your own entree.  · If you get your own entree, eat only half. Ask for a box at the beginning of your meal and put the rest of your  "entree in it so you are not tempted to eat it.  · Look for the calories on the menu. If calories are listed, choose the lower calorie options.  · Choose dishes that include vegetables, fruits, whole grains, low-fat dairy products, and lean protein. Focusing on smart food choices from each of the 5 food groups can help you stay on track at restaurants.  · Choose items that are boiled, broiled, grilled, or steamed.  · Choose water, milk, unsweetened iced tea, or other drinks without added sugars. If you want an alcoholic beverage, choose a lower calorie option. For example, a regular lani can have up to 700 calories and a glass of wine has around 150.  · Stay away from items that are buttered, battered, fried, or served with cream sauce. Items labeled \"crispy\" are usually fried, unless stated otherwise.  · Ask for dressings, sauces, and syrups on the side. These are usually very high in calories, so do not eat much of them.  · Watch out for salads. Many people think salads are a healthy option, but this is often not the case. Many salads come with ramos, fried chicken, lots of cheese, fried chips, and dressing. All of these items have a lot of calories. If you want a salad, choose a garden salad and ask for grilled meats or steak. Ask for the dressing on the side, or ask for olive oil and vinegar or lemon to use as dressing.  · Estimate how many servings of a food you are given. For example, a serving of cooked rice is ½ cup or about the size of half a tennis ball or one cupcake wrapper. Knowing serving sizes will help you be aware of how much food you are eating at restaurants. The list below tells you how big or small some common portion sizes are based on everyday objects.  ¨ 1 oz--4 stacked dice.  ¨ 3 oz--1 deck of cards.  ¨ 1 tsp--1 dice.  ¨ 1 Tbsp--½ a Ping-Pong ball.  ¨ 2 Tbsp--1 Ping-Pong ball.  ¨ ½ cup--1 tennis ball or 1 cupcake wrapper.  ¨ 1 cup--1 baseball.  This information is not intended to replace " advice given to you by your health care provider. Make sure you discuss any questions you have with your health care provider.  Document Released: 12/18/2006 Document Revised: 01/08/2016 Document Reviewed: 10/23/2014  Elsevier Interactive Patient Education © 2017 [employer]

## 2018-02-19 ENCOUNTER — OFFICE VISIT (OUTPATIENT)
Dept: FAMILY MEDICINE CLINIC | Facility: CLINIC | Age: 33
End: 2018-02-19

## 2018-02-19 VITALS
BODY MASS INDEX: 28.88 KG/M2 | DIASTOLIC BLOOD PRESSURE: 84 MMHG | TEMPERATURE: 97.8 F | OXYGEN SATURATION: 97 % | SYSTOLIC BLOOD PRESSURE: 124 MMHG | HEART RATE: 117 BPM | WEIGHT: 163 LBS | RESPIRATION RATE: 16 BRPM | HEIGHT: 63 IN

## 2018-02-19 DIAGNOSIS — J06.9 ACUTE URI: Primary | ICD-10-CM

## 2018-02-19 PROCEDURE — 99213 OFFICE O/P EST LOW 20 MIN: CPT | Performed by: FAMILY MEDICINE

## 2018-02-19 NOTE — PROGRESS NOTES
Subjective   Shasta Godinez is a 32 y.o. female. Presents with cough/congstion x 4 days.    History of Present Illness     URI symptoms - Going on since about 3 days ago.  +Sneezing, coughing, congestion, ear pressure.  Feelings of warmth.  She says that she was exposed to her son who has flu last week.  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    The following portions of the patient's history were reviewed and updated as appropriate: allergies, current medications, past family history, past medical history, past social history, past surgical history and problem list.    Review of Systems   Constitutional: Positive for fatigue.   HENT: Positive for congestion, rhinorrhea, sinus pressure and sneezing. Negative for facial swelling and sinus pain.    Respiratory: Positive for cough. Negative for wheezing.    Cardiovascular: Negative for chest pain and palpitations.       Objective   Physical Exam   Constitutional: She appears well-developed and well-nourished.   HENT:   Right Ear: External ear normal.   Left Ear: External ear normal.   Nose: Nose normal.   Mouth/Throat: Oropharynx is clear and moist. No oropharyngeal exudate.   Eyes: Conjunctivae are normal. Right eye exhibits no discharge. Left eye exhibits no discharge. No scleral icterus.   Neck: No thyromegaly present.   Cardiovascular: Normal rate, regular rhythm, normal heart sounds and intact distal pulses.  Exam reveals no gallop and no friction rub.    No murmur heard.  Pulmonary/Chest: Effort normal and breath sounds normal. No respiratory distress. She has no wheezes. She has no rales. She exhibits no tenderness.   Abdominal: Soft. Bowel sounds are normal.   Lymphadenopathy:     She has no cervical adenopathy.   Nursing note and vitals reviewed.      Assessment/Plan   Shasta was seen today for cough and nasal congestion.    Diagnoses and all orders for this visit:    Acute URI    Likely the common cold.  Nothing to add that  she is on medications to help including the Mucinex DM, Tylenol, and Flonase.  She is outside the treatment range for prophylaxis of the flu per the CDC.  She may call back if she has any worsening symptoms.

## 2020-06-29 ENCOUNTER — TELEMEDICINE (OUTPATIENT)
Dept: FAMILY MEDICINE CLINIC | Facility: CLINIC | Age: 35
End: 2020-06-29

## 2020-06-29 DIAGNOSIS — B86 SCABIES: Primary | ICD-10-CM

## 2020-06-29 PROCEDURE — 99213 OFFICE O/P EST LOW 20 MIN: CPT | Performed by: FAMILY MEDICINE

## 2020-06-29 RX ORDER — PERMETHRIN 50 MG/G
CREAM TOPICAL
Qty: 60 G | Refills: 1 | Status: SHIPPED | OUTPATIENT
Start: 2020-06-29

## 2020-06-29 NOTE — PATIENT INSTRUCTIONS
Please apply the permethrin as shown in your prescription.  You had mentioned on the phone about an oral medication.  There is a reason that you do not need the oral medication but by giving too much information it may compromise your colleagues.  But hopefully to suffice say that you are immunocompetent and do not require the oral to clear the infection

## 2020-06-29 NOTE — PROGRESS NOTES
Subjective   Shasta Godinez is a 35 y.o. female. Presents via mychart video visit for rash and itching.    You have chosen to receive care through a telehealth visit.  Do you consent to use a video/audio connection for your medical care today? 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  She said a few had to get an oral medication along with the topical medication.  She says the rash is very itchy and located just under her breasts and abdomen.  She has not tried treating it with anything.  Nothing makes it better or worse.    The following portions of the patient's history were reviewed and updated as appropriate: allergies, current medications, past family history, past medical history, past social history, past surgical history and problem list.    Review of Systems   Skin: Positive for rash.       Objective   Physical Exam   Constitutional: She appears well-developed and well-nourished. No distress.   HENT:   Right Ear: External ear normal.   Left Ear: External ear normal.   Nose: Nose normal.   Eyes: Conjunctivae are normal. Right eye exhibits no discharge. Left eye exhibits no discharge.   Pulmonary/Chest: Effort normal.   Skin: Rash noted.       Assessment/Plan   Diagnoses and all orders for this visit:    Scabies  -     permethrin (ELIMITE) 5 % cream; Apply to entire skin from chin down to and including toes.  Leave on for 8 to 14 hours.  Wash off.  May repeat in 2 weeks once more.      Sent in the permethrin.  Per [facility] guide there is no need to do ivermectin along with this in her case as she is immunocompetent and these are not [place] scabies.  May repeat dose in 2 weeks.  F/U PRN.    I spent 12 minutes on the call and another 5 minutes completing the encounter along with research.

## 2020-07-02 ENCOUNTER — OFFICE VISIT (OUTPATIENT)
Dept: FAMILY MEDICINE CLINIC | Facility: CLINIC | Age: 35
End: 2020-07-02

## 2020-07-02 VITALS
BODY MASS INDEX: 30.47 KG/M2 | WEIGHT: 165.6 LBS | SYSTOLIC BLOOD PRESSURE: 136 MMHG | DIASTOLIC BLOOD PRESSURE: 84 MMHG | OXYGEN SATURATION: 98 % | TEMPERATURE: 98 F | HEART RATE: 91 BPM | HEIGHT: 62 IN

## 2020-07-02 DIAGNOSIS — B86 NORWEGIAN SCABIES: Primary | ICD-10-CM

## 2020-07-02 PROCEDURE — 99213 OFFICE O/P EST LOW 20 MIN: CPT | Performed by: FAMILY MEDICINE

## 2020-07-02 RX ORDER — IVERMECTIN 3 MG/1
TABLET ORAL
Qty: 25 TABLET | Refills: 0 | Status: SHIPPED | OUTPATIENT
Start: 2020-07-02

## 2020-07-02 NOTE — PROGRESS NOTES
Subjective   Shasta Godinez is a 35 y.o. female. Presents for scabies follow-up.      History of Present Illness      Scabies-the rash is still spreading.  She has more information about the patient that initially had this problem.  She says that the nursing home has diagnosed them with [place] scabies and has been treating several of the exposed staph as such.  Many of them got a combination of permethrin and ivermectin.  She got the permethrin from me but we did not do that ivermectin as we did not know it was Belgian.  No fever or chills.  The itching is better but the rash is still spreading to places like her arms and under her bra.    The following portions of the patient's history were reviewed and updated as appropriate: allergies, current medications, past family history, past medical history, past social history, past surgical history and problem list.    Review of Systems   Skin: Positive for rash.       Objective   Physical Exam   Constitutional: She appears well-developed and well-nourished. No distress.   HENT:   Right Ear: External ear normal.   Left Ear: External ear normal.   Nose: Nose normal.   Eyes: Conjunctivae are normal. Right eye exhibits no discharge. Left eye exhibits no discharge.   Pulmonary/Chest: Effort normal.   Skin: Rash noted.   I have reviewed the HPI, review of systems, and physical exam and they are all accurate with the patient and for this encounter.      Assessment/Plan   Shasta was seen today for follow-up.    Diagnoses and all orders for this visit:    [place] scabies  -     ivermectin (STROMECTOL) 3 MG tablet tablet; Take 5 tablets on days 1,2, 8, 9, and 15.      I have sent in the ivermectin based on [facility].  I told her to give me a call if after a few weeks she still having the rash is according to the [facility] you need to treat for an extra 2 days if that is the case.

## 2020-07-02 NOTE — PATIENT INSTRUCTIONS
Scabies, Adult    Scabies is a skin condition that happens when very small insects get under the skin (infestation). This causes a rash and severe itchiness. Scabies can spread from person to person (is contagious). If you get scabies, it is common for others in your household to get scabies too.  With proper treatment, symptoms usually go away in 2-4 weeks. Scabies usually does not cause lasting problems.  What are the causes?  This condition is caused by tiny mites (Sarcoptes scabiei, or human itch mites) that can only be seen with a microscope. The mites get into the top layer of skin and lay eggs. Scabies can spread from person to person through:  · Close contact with a person who has scabies.  · Sharing or having contact with infested items, such as towels, bedding, or clothing.  What increases the risk?  The following factors may make you more likely to develop this condition:  · Living in a nursing home or other extended care facility.  · Having sexual contact with a partner who has scabies.  · Caring for others who are at increased risk for scabies.  What are the signs or symptoms?  Symptoms of this condition include:  · Severe itchiness. This is often worse at night.  · A rash that includes tiny red bumps or blisters. The rash commonly occurs on the hands, wrists, elbows, armpits, chest, waist, groin, or buttocks. The bumps may form a line (burrow) in some areas.  · Skin irritation. This can include scaly patches or sores.  How is this diagnosed?  This condition may be diagnosed based on:  · A physical exam of the skin.  · A skin test. Your health care provider may take a sample of your affected skin (skin scraping) and have it examined under a microscope for signs of mites.  How is this treated?  This condition may be treated with:  · Medicated cream or lotion that kills the mites. This is spread on the entire body and left on for several hours. Usually, one treatment with medicated cream or lotion is  enough to kill all the mites. In severe cases, the treatment may need to be repeated.  · Medicated cream that relieves itching.  · Medicines taken by mouth (orally) that:  ? Relieve itching.  ? Reduce the swelling and redness.  ? Kill the mites. This treatment may be done in severe cases.  Follow these instructions at home:  Medicines    · Take or apply over-the-counter and prescription medicines as told by your health care provider.  · Apply medicated cream or lotion as told by your health care provider.  · Do not wash off the medicated cream or lotion until the necessary amount of time has passed.  Skin care    · Avoid scratching the affected areas of your skin.  · Keep your fingernails closely trimmed to reduce injury from scratching.  · Take cool baths or apply cool washcloths to your skin to help reduce itching.  General instructions  · Clean all items that you recently had contact with, including bedding, clothing, and furniture. Do this on the same day that you start treatment.  ? Dry clean items, or use hot water to wash items. Dry items on the hot dry cycle.  ? Place items that cannot be washed into closed, airtight plastic bags for at least 3 days. The mites cannot live for more than 3 days away from human skin.  ? Vacuum furniture and mattresses that you use.  · Make sure that other people who may have been infested are examined by a health care provider. These include members of your household and anyone who may have had contact with infested items.  · Keep all follow-up visits as told by your health care provider. This is important.  Contact a health care provider if:  · You have itching that does not go away after 4 weeks of treatment.  · You continue to develop new bumps or burrows.  · You have redness, swelling, or pain in your rash area after treatment.  · You have fluid, blood, or pus coming from your rash.  Summary  · Scabies is a skin condition that causes a rash and severe itchiness.  · This  condition is caused by tiny mites that get into the top layer of the skin and lay eggs.  · Scabies can spread from person to person.  · Follow treatments as recommended by your health care provider.  · Clean all items that you recently had contact with.  This information is not intended to replace advice given to you by your health care provider. Make sure you discuss any questions you have with your health care provider.  Document Released: 09/07/2016 Document Revised: 10/23/2019 Document Reviewed: 10/23/2019  Elsevier Patient Education © 2020 [employer]